# Patient Record
(demographics unavailable — no encounter records)

---

## 2025-07-18 NOTE — PHYSICAL EXAM
[Normal] : affect was normal and insight and judgment were intact [de-identified] : Mild tenderness to palpation of the left lower quadrant

## 2025-07-18 NOTE — HEALTH RISK ASSESSMENT
[Yes] : Yes [Monthly or less (1 pt)] : Monthly or less (1 point) [1 or 2 (0 pts)] : 1 or 2 (0 points) [No] : In the past 12 months have you used drugs other than those required for medical reasons? No [No falls in past year] : Patient reported no falls in the past year [Little interest or pleasure doing things] : 1) Little interest or pleasure doing things [Feeling down, depressed, or hopeless] : 2) Feeling down, depressed, or hopeless [0] : 2) Feeling down, depressed, or hopeless: Not at all (0) [Patient reported PAP Smear was normal] : Patient reported PAP Smear was normal [HIV Test offered] : HIV Test offered [With Family] : lives with family [Employed] : employed [] :  [# Of Children ___] : has [unfilled] children [Sexually Active] : sexually active [Feels Safe at Home] : Feels safe at home [Fully functional (bathing, dressing, toileting, transferring, walking, feeding)] : Fully functional (bathing, dressing, toileting, transferring, walking, feeding) [Fully functional (using the telephone, shopping, preparing meals, housekeeping, doing laundry, using] : Fully functional and needs no help or supervision to perform IADLs (using the telephone, shopping, preparing meals, housekeeping, doing laundry, using transportation, managing medications and managing finances) [Smoke Detector] : smoke detector [Carbon Monoxide Detector] : carbon monoxide detector [Seat Belt] :  uses seat belt [Sunscreen] : uses sunscreen [Never] : Never [NO] : No [PHQ-2 Negative - No further assessment needed] : PHQ-2 Negative - No further assessment needed [de-identified] : None [de-identified] : None [de-identified] : Ocasionally [Audit-CScore] : 1 [de-identified] : workout 3-5 a week [de-identified] : balanced [KBH5Dtsax] : 0 [Change in mental status noted] : No change in mental status noted [Language] : denies difficulty with language [Behavior] : denies difficulty with behavior [Learning/Retaining New Information] : denies difficulty learning/retaining new information [Handling Complex Tasks] : denies difficulty handling complex tasks [Reasoning] : denies difficulty with reasoning [Spatial Ability and Orientation] : denies difficulty with spatial ability and orientation [High Risk Behavior] : no high risk behavior [Reports changes in hearing] : Reports no changes in hearing [Reports changes in vision] : Reports no changes in vision [Reports normal functional visual acuity (ie: able to read med bottle)] : Reports poor functional visual acuity.  [Reports changes in dental health] : Reports no changes in dental health [Safety elements used in home] : no safety elements used in home [Travel to Developing Areas] : does not  travel to developing areas [TB Exposure] : is not being exposed to tuberculosis [Caregiver Concerns] : does not have caregiver concerns [PapSmearDate] : 02/2024

## 2025-07-18 NOTE — PLAN
[FreeTextEntry1] :  #HCM - CBC with differential, HIV, Hep C, CMP, hemoglobin A1c, vitamin B12, vitamin D, urine analysis, lipid panel, TFTs all ordered. Patient to be informed of results. - The patient has screened negative for depression and excessive alcohol use. - The patient has never used tobacco products. - The [place] Preventive Service Task Force recommends yearly lung cancer screening with LDCT for people who have a 20 pack-year or more smoking history and smoke now or have quit within the past 15 years and are between 50 and 80 years old. - Blood pressure as taken in the office today is within normal limits (<120/<80). - EKG performed in the office today is within normal limits. - Counseled on maintaining a healthy body weight. It has been estimated that up to one-third of cardiovascular disease deaths may be preventable by healthy diet and physical activity.  Recommended low FODMAP diet as well as increasing dietary intake of fiber for constipation and abdominal bloating symptoms.  The patient was advised to download the [employer] health application on her phone and she knows that I will send her a message reviewing blood test results, she is in agreement with this plan.

## 2025-07-18 NOTE — HISTORY OF PRESENT ILLNESS
[de-identified] : 30-year-old female with no significant past medical history is presenting today for a CPE.  Her last CPE was about a year ago and there were no abnormalities found at the time.  She was never hospitalized and denies ever going to the ER.  Family history was reviewed and there is no significant history.  She exercises and goes to the gym 3-5 times per week.  Her only concern today is occasional constipation associated with abdominal bloating after eating certain foods including planting and rice.